# Patient Record
Sex: FEMALE | URBAN - METROPOLITAN AREA
[De-identification: names, ages, dates, MRNs, and addresses within clinical notes are randomized per-mention and may not be internally consistent; named-entity substitution may affect disease eponyms.]

---

## 2022-06-16 ENCOUNTER — APPOINTMENT (OUTPATIENT)
Dept: URBAN - METROPOLITAN AREA CLINIC 249 | Age: 62
Setting detail: DERMATOLOGY
End: 2022-06-18

## 2022-06-16 DIAGNOSIS — Z41.9 ENCOUNTER FOR PROCEDURE FOR PURPOSES OTHER THAN REMEDYING HEALTH STATE, UNSPECIFIED: ICD-10-CM

## 2022-06-16 PROCEDURE — OTHER BOTOX (U OR CC): OTHER

## 2022-07-21 ENCOUNTER — APPOINTMENT (OUTPATIENT)
Dept: URBAN - METROPOLITAN AREA CLINIC 249 | Age: 62
Setting detail: DERMATOLOGY
End: 2022-07-23

## 2022-07-21 DIAGNOSIS — Z41.9 ENCOUNTER FOR PROCEDURE FOR PURPOSES OTHER THAN REMEDYING HEALTH STATE, UNSPECIFIED: ICD-10-CM

## 2022-07-21 PROCEDURE — OTHER RESTYLANE-L INJECTION: OTHER

## 2022-07-21 PROCEDURE — OTHER JUVEDERM VOLUMA XC INJECTION: OTHER

## 2022-07-21 NOTE — PROCEDURE: JUVEDERM VOLUMA XC INJECTION
Price (Use Numbers Only, No Special Characters Or $): 110 Price (Use Numbers Only, No Special Characters Or $): 581

## 2023-06-26 ENCOUNTER — APPOINTMENT (OUTPATIENT)
Dept: URBAN - METROPOLITAN AREA CLINIC 249 | Age: 63
Setting detail: DERMATOLOGY
End: 2023-06-26

## 2023-06-26 DIAGNOSIS — Z41.9 ENCOUNTER FOR PROCEDURE FOR PURPOSES OTHER THAN REMEDYING HEALTH STATE, UNSPECIFIED: ICD-10-CM

## 2023-06-26 PROCEDURE — OTHER BOTOX: OTHER

## 2023-06-26 PROCEDURE — OTHER JUVEDERM ULTRA XC INJECTION: OTHER

## 2023-06-26 NOTE — PROCEDURE: JUVEDERM ULTRA XC INJECTION
Anesthesia Type: 1% lidocaine with epinephrine
Dorsal Hands Filler  Volume In Cc: 0
Include Cannula Information In Note?: No
Post-Care Instructions: Patient instructed to apply ice to reduce swelling.
Filler: Juvederm Ultra XC
Nasolabial Folds Filler Volume In Cc: 1
Show Inventory Tab: Show
Additional Anesthesia Volume In Cc: 6
Anesthesia Volume In Cc: 0.5
Detail Level: Detailed
Consent: Written consent obtained. Risks include but not limited to bruising, beading, irregular texture, ulceration, infection, allergic reaction, scar formation, incomplete augmentation, temporary nature, procedural pain.
Procedural Text: The filler was administered to the treatment areas noted above.
Map Statment: See Attach Map for Complete Details

## 2024-06-24 ENCOUNTER — APPOINTMENT (OUTPATIENT)
Dept: URBAN - METROPOLITAN AREA CLINIC 249 | Age: 64
Setting detail: DERMATOLOGY
End: 2024-06-24

## 2024-06-24 DIAGNOSIS — Z41.9 ENCOUNTER FOR PROCEDURE FOR PURPOSES OTHER THAN REMEDYING HEALTH STATE, UNSPECIFIED: ICD-10-CM

## 2024-06-24 PROCEDURE — OTHER BOTOX: OTHER

## 2024-06-24 PROCEDURE — OTHER JUVEDERM VOLUMA XC INJECTION: OTHER

## 2024-06-24 NOTE — PROCEDURE: BOTOX
Glabellar Complex Units: 0
Reconstitution Date: 06/26/2023
Dilution (U/0.1 Cc): 4
Use Map Statement For Sites (Optional): No
Map Statment: See attached map for complete details
Bill Summary Price Listed Below, Or Bill Total Of Units X Price Per Unit?: Bill Summary Price Below
Consent: Written consent was obtained prior to the procedure. Risks, benefits, expectations and alternatives were discussed including, but not limited to, infection, bleeding, lid/brow ptosis, bruising, swelling, diplopia, temporary effects, incomplete chemical denervation and dissatisfaction with the cosmetic outcome. No guarantee or warranty was given or implied regarding longevity of results.
Detail Level: Detailed
Show Inventory Tab: Show
Additional Area 1 Units: 35
Additional Area 1 Location: forehead, glabella, crow’s feet
Postcare Instructions: Patient instructed to not lie down for 4 hours and limit physical activity for 24 hours. Patient instructed not to travel by airplane for 48 hours.

## 2024-06-24 NOTE — PROCEDURE: JUVEDERM VOLUMA XC INJECTION
Number Of Syringes (Required For Inventory): 1
Vermilion Lips Filler Volume In Cc: 0
Topical Anesthesia?: BLT cream (benzocaine 8%, lidocaine 5%, tetracaine 4%)
Procedural Text: The filler was administered to the treatment areas noted above.
Additional Anesthesia Volume In Cc: 6
Include Cannula Information In Note?: No
Map Statment: See Attach Map for Complete Details
Post-Care Instructions: Patient instructed to apply ice to reduce swelling.
Consent: Written consent obtained. Risks include but not limited to bruising, beading, irregular texture, ulceration, infection, allergic reaction, scar formation, incomplete augmentation, temporary nature, procedural pain.
Show Inventory Tab: Hide
Filler: Juvederm Voluma XC
Price (Use Numbers Only, No Special Characters Or $): 1400
Cheeks Filler Volume In Cc: 2
Anesthesia Type: 1% lidocaine with epinephrine
Detail Level: Detailed

## 2025-08-21 ENCOUNTER — APPOINTMENT (OUTPATIENT)
Dept: URBAN - METROPOLITAN AREA CLINIC 248 | Age: 65
Setting detail: DERMATOLOGY
End: 2025-08-21

## 2025-08-21 DIAGNOSIS — Z41.9 ENCOUNTER FOR PROCEDURE FOR PURPOSES OTHER THAN REMEDYING HEALTH STATE, UNSPECIFIED: ICD-10-CM

## 2025-08-21 PROCEDURE — OTHER BOTOX: OTHER

## 2025-08-21 PROCEDURE — OTHER JUVEDERM ULTRA PLUS XC INJECTION: OTHER
